# Patient Record
Sex: FEMALE | Race: ASIAN | Employment: OTHER | URBAN - METROPOLITAN AREA
[De-identification: names, ages, dates, MRNs, and addresses within clinical notes are randomized per-mention and may not be internally consistent; named-entity substitution may affect disease eponyms.]

---

## 2019-01-22 RX ORDER — SIMVASTATIN 10 MG
10 TABLET ORAL AT BEDTIME
COMMUNITY

## 2019-01-22 RX ORDER — ZOLPIDEM TARTRATE 5 MG/1
5 TABLET ORAL
COMMUNITY

## 2019-01-22 RX ORDER — MULTIPLE VITAMINS W/ MINERALS TAB 9MG-400MCG
1 TAB ORAL DAILY
COMMUNITY

## 2019-01-22 RX ORDER — OMEPRAZOLE 20 MG/1
20 TABLET, DELAYED RELEASE ORAL DAILY
COMMUNITY

## 2019-01-22 RX ORDER — DOCUSATE SODIUM 100 MG/1
100 CAPSULE, LIQUID FILLED ORAL 2 TIMES DAILY
COMMUNITY

## 2019-01-22 RX ORDER — ASPIRIN 81 MG/1
81 TABLET ORAL DAILY
COMMUNITY

## 2019-01-23 ENCOUNTER — HOSPITAL ENCOUNTER (OUTPATIENT)
Facility: CLINIC | Age: 74
Discharge: HOME | End: 2019-01-23
Attending: OPHTHALMOLOGY | Admitting: OPHTHALMOLOGY
Payer: COMMERCIAL

## 2019-01-23 ENCOUNTER — ANESTHESIA (OUTPATIENT)
Dept: SURGERY | Facility: CLINIC | Age: 74
End: 2019-01-23
Payer: COMMERCIAL

## 2019-01-23 ENCOUNTER — ANESTHESIA EVENT (OUTPATIENT)
Dept: SURGERY | Facility: CLINIC | Age: 74
End: 2019-01-23
Payer: COMMERCIAL

## 2019-01-23 VITALS
OXYGEN SATURATION: 95 % | DIASTOLIC BLOOD PRESSURE: 88 MMHG | RESPIRATION RATE: 20 BRPM | SYSTOLIC BLOOD PRESSURE: 140 MMHG | WEIGHT: 134.92 LBS | TEMPERATURE: 97.5 F | HEART RATE: 72 BPM

## 2019-01-23 PROCEDURE — 27210794 ZZH OR GENERAL SUPPLY STERILE: Performed by: OPHTHALMOLOGY

## 2019-01-23 PROCEDURE — 25000128 H RX IP 250 OP 636: Performed by: ANESTHESIOLOGY

## 2019-01-23 PROCEDURE — 25000566 ZZH SEVOFLURANE, EA 15 MIN: Performed by: OPHTHALMOLOGY

## 2019-01-23 PROCEDURE — 27210995 ZZH RX 272: Performed by: OPHTHALMOLOGY

## 2019-01-23 PROCEDURE — 37000008 ZZH ANESTHESIA TECHNICAL FEE, 1ST 30 MIN: Performed by: OPHTHALMOLOGY

## 2019-01-23 PROCEDURE — 25000128 H RX IP 250 OP 636: Performed by: NURSE ANESTHETIST, CERTIFIED REGISTERED

## 2019-01-23 PROCEDURE — 71000028 ZZH EYE RECOVERY PHASE 2 EACH 15 MINS: Performed by: OPHTHALMOLOGY

## 2019-01-23 PROCEDURE — 71000006 ZZH RECOVERY EYE PHASE 1 LEVEL 2 FIRST HR: Performed by: OPHTHALMOLOGY

## 2019-01-23 PROCEDURE — 40000170 ZZH STATISTIC PRE-PROCEDURE ASSESSMENT II: Performed by: OPHTHALMOLOGY

## 2019-01-23 PROCEDURE — 36000101 ZZH EYE SURGERY LEVEL 3 1ST 30 MIN: Performed by: OPHTHALMOLOGY

## 2019-01-23 PROCEDURE — 25000125 ZZHC RX 250: Performed by: OPHTHALMOLOGY

## 2019-01-23 PROCEDURE — 36000102 ZZH EYE SURGERY LEVEL 3 EA 15 ADDTL MIN: Performed by: OPHTHALMOLOGY

## 2019-01-23 PROCEDURE — 25000125 ZZHC RX 250: Performed by: NURSE ANESTHETIST, CERTIFIED REGISTERED

## 2019-01-23 PROCEDURE — L8610 OCULAR IMPLANT: HCPCS | Performed by: OPHTHALMOLOGY

## 2019-01-23 PROCEDURE — 37000009 ZZH ANESTHESIA TECHNICAL FEE, EACH ADDTL 15 MIN: Performed by: OPHTHALMOLOGY

## 2019-01-23 DEVICE — EYE IMP INTUBATION SET RITLENG BICANAL S1.1450: Type: IMPLANTABLE DEVICE | Site: LACRIMAL DUCT | Status: FUNCTIONAL

## 2019-01-23 RX ORDER — ERYTHROMYCIN 5 MG/G
OINTMENT OPHTHALMIC PRN
Status: DISCONTINUED | OUTPATIENT
Start: 2019-01-23 | End: 2019-01-23 | Stop reason: HOSPADM

## 2019-01-23 RX ORDER — ONDANSETRON 4 MG/1
4 TABLET, ORALLY DISINTEGRATING ORAL EVERY 30 MIN PRN
Status: DISCONTINUED | OUTPATIENT
Start: 2019-01-23 | End: 2019-01-23 | Stop reason: HOSPADM

## 2019-01-23 RX ORDER — FENTANYL CITRATE 50 UG/ML
INJECTION, SOLUTION INTRAMUSCULAR; INTRAVENOUS PRN
Status: DISCONTINUED | OUTPATIENT
Start: 2019-01-23 | End: 2019-01-23

## 2019-01-23 RX ORDER — SODIUM CHLORIDE, SODIUM LACTATE, POTASSIUM CHLORIDE, CALCIUM CHLORIDE 600; 310; 30; 20 MG/100ML; MG/100ML; MG/100ML; MG/100ML
INJECTION, SOLUTION INTRAVENOUS CONTINUOUS
Status: DISCONTINUED | OUTPATIENT
Start: 2019-01-23 | End: 2019-01-23 | Stop reason: HOSPADM

## 2019-01-23 RX ORDER — FENTANYL CITRATE 50 UG/ML
25-50 INJECTION, SOLUTION INTRAMUSCULAR; INTRAVENOUS
Status: DISCONTINUED | OUTPATIENT
Start: 2019-01-23 | End: 2019-01-23 | Stop reason: HOSPADM

## 2019-01-23 RX ORDER — LIDOCAINE HYDROCHLORIDE 20 MG/ML
INJECTION, SOLUTION INFILTRATION; PERINEURAL PRN
Status: DISCONTINUED | OUTPATIENT
Start: 2019-01-23 | End: 2019-01-23

## 2019-01-23 RX ORDER — HYDROMORPHONE HYDROCHLORIDE 1 MG/ML
.3-.5 INJECTION, SOLUTION INTRAMUSCULAR; INTRAVENOUS; SUBCUTANEOUS EVERY 10 MIN PRN
Status: DISCONTINUED | OUTPATIENT
Start: 2019-01-23 | End: 2019-01-23 | Stop reason: HOSPADM

## 2019-01-23 RX ORDER — ONDANSETRON 2 MG/ML
4 INJECTION INTRAMUSCULAR; INTRAVENOUS EVERY 30 MIN PRN
Status: DISCONTINUED | OUTPATIENT
Start: 2019-01-23 | End: 2019-01-23 | Stop reason: HOSPADM

## 2019-01-23 RX ORDER — OXYMETAZOLINE HYDROCHLORIDE 0.05 G/100ML
SPRAY NASAL PRN
Status: DISCONTINUED | OUTPATIENT
Start: 2019-01-23 | End: 2019-01-23 | Stop reason: HOSPADM

## 2019-01-23 RX ORDER — NALOXONE HYDROCHLORIDE 0.4 MG/ML
.1-.4 INJECTION, SOLUTION INTRAMUSCULAR; INTRAVENOUS; SUBCUTANEOUS
Status: DISCONTINUED | OUTPATIENT
Start: 2019-01-23 | End: 2019-01-23 | Stop reason: HOSPADM

## 2019-01-23 RX ORDER — PROPOFOL 10 MG/ML
INJECTION, EMULSION INTRAVENOUS PRN
Status: DISCONTINUED | OUTPATIENT
Start: 2019-01-23 | End: 2019-01-23

## 2019-01-23 RX ORDER — ONDANSETRON 2 MG/ML
INJECTION INTRAMUSCULAR; INTRAVENOUS PRN
Status: DISCONTINUED | OUTPATIENT
Start: 2019-01-23 | End: 2019-01-23

## 2019-01-23 RX ORDER — DEXAMETHASONE SODIUM PHOSPHATE 4 MG/ML
INJECTION, SOLUTION INTRA-ARTICULAR; INTRALESIONAL; INTRAMUSCULAR; INTRAVENOUS; SOFT TISSUE PRN
Status: DISCONTINUED | OUTPATIENT
Start: 2019-01-23 | End: 2019-01-23

## 2019-01-23 RX ORDER — MEPERIDINE HYDROCHLORIDE 25 MG/ML
12.5 INJECTION INTRAMUSCULAR; INTRAVENOUS; SUBCUTANEOUS
Status: DISCONTINUED | OUTPATIENT
Start: 2019-01-23 | End: 2019-01-23 | Stop reason: HOSPADM

## 2019-01-23 RX ADMIN — DEXAMETHASONE SODIUM PHOSPHATE 4 MG: 4 INJECTION, SOLUTION INTRA-ARTICULAR; INTRALESIONAL; INTRAMUSCULAR; INTRAVENOUS; SOFT TISSUE at 12:42

## 2019-01-23 RX ADMIN — SUCCINYLCHOLINE CHLORIDE 100 MG: 20 INJECTION, SOLUTION INTRAMUSCULAR; INTRAVENOUS; PARENTERAL at 12:27

## 2019-01-23 RX ADMIN — FENTANYL CITRATE 50 MCG: 50 INJECTION, SOLUTION INTRAMUSCULAR; INTRAVENOUS at 13:50

## 2019-01-23 RX ADMIN — SODIUM CHLORIDE, POTASSIUM CHLORIDE, SODIUM LACTATE AND CALCIUM CHLORIDE: 600; 310; 30; 20 INJECTION, SOLUTION INTRAVENOUS at 12:18

## 2019-01-23 RX ADMIN — FENTANYL CITRATE 50 MCG: 50 INJECTION, SOLUTION INTRAMUSCULAR; INTRAVENOUS at 12:27

## 2019-01-23 RX ADMIN — ONDANSETRON 4 MG: 2 INJECTION INTRAMUSCULAR; INTRAVENOUS at 12:42

## 2019-01-23 RX ADMIN — FENTANYL CITRATE 50 MCG: 50 INJECTION, SOLUTION INTRAMUSCULAR; INTRAVENOUS at 13:33

## 2019-01-23 RX ADMIN — LIDOCAINE HYDROCHLORIDE 60 MG: 20 INJECTION, SOLUTION INFILTRATION; PERINEURAL at 12:27

## 2019-01-23 RX ADMIN — PROPOFOL 160 MG: 10 INJECTION, EMULSION INTRAVENOUS at 12:27

## 2019-01-23 ASSESSMENT — COPD QUESTIONNAIRES: COPD: 0

## 2019-01-23 NOTE — OP NOTE
Pre-op Diagnosis:  1.  Right Nasolacrimal Duct Obstruction  2.  Right Epiphora  Post-operative Diagnosis:  Same    Procedure:  1.  Right Dacryocystorhinostomy 2.  Left lacrimal intubation    Surgeon:  Laz Walker MD    Anesthesia:  General, 2% Lidocaine with 1:100,000 epinephrine and sodium bicarbonate    Complications:  None    EBL:  5cc    Indications for surgery:  The patient presented to the office with a history of chronic tearing and drainage from the eye.  Lacrimal testing, including schirmers, dye dissappearance testing, and lacrimal irrigation demonstrated a nasolacrimal duct obstruction.  We discussed the risks, benefits, and alternatives of the aforementioned procedures and the patient wished to proceed.      Procedure:  The nose was sprayed with oxymetazoline spray.  The area of the medial canthus was marked using a skin marking pen.  General antesthesia was induced and the medial canthus, and infratrochlear area were infiltrated with the local anesthetic.  The nose was packed with neuro patties soaked in oxymetazoline.  The full face was prepped and draped in the usual sterile oculoplastic manner.  The upper and lower puncta were dilated with lacrimal dilators and a isiah scissor used to create a one-snip punctoplasty.  The marked area was incised with Muse scissors.  The underlying orbicularis was dissected bluntly with scissors and the periosteum incised using the monopolar cautery.  Hemostasis was achieved with the cautery.  A freer elevator was used to lift the periosteum off the lacrimal bone, and the lacrimal sac was lateralized. The medial canthal tendon was elevated, but not incised, using the elevator. The elevator was used to punch through the lacrimal bone.  Rongeurs were used to create an osteotomy through the bone.  Once an adequately sized osteotomy was created, the nasal packing was removed and a freer elevator used to tent the nasal mucosa.  A tejeda probe was inserted into the  canaliculus and an 11 blade was used to incise the medial wall of the lacrimal sac.  The isiah was used to remove a portion of the medial lacrimal sac.  The sac was inspected and found to be normal.  The nasal mucosa was incised with the blade and a superiorly based flap constructed.  The Ritleng stent was passed through both upper and lower puncta and through the nose, where it was tied.  A 5-0 vicryl suture was used to anastamose the nasal mucosal flap and the superior aspect of the lacrimal sac flap.  The orbicularis was reapproximated with a buried 5-0 vicryl stitch.  The overlying skin was closed with dermabond.  A small amount of erythromycin ointment was placed on the incision and the patient was discharge from the operating room in good condition.

## 2019-01-23 NOTE — ANESTHESIA POSTPROCEDURE EVALUATION
Patient: Amanda Dee    Procedure(s):  RIGHT DACRYOCYSTORHINOSTOMY WITH STENT PLACEMENT    Diagnosis:RIGHT DACRYOCYSTORHINOSTOMY WITH STENT PLACEMENT  Diagnosis Additional Information: No value filed.    Anesthesia Type:  General, ETT    Note:  Anesthesia Post Evaluation    Patient location during evaluation: PACU  Patient participation: Able to fully participate in evaluation  Level of consciousness: awake, awake and alert and responsive to verbal stimuli  Pain management: adequate  Airway patency: patent  Cardiovascular status: acceptable  Respiratory status: acceptable  Hydration status: acceptable  PONV: none     Anesthetic complications: None          Last vitals:  Vitals:    01/23/19 1200 01/23/19 1304 01/23/19 1315   BP: 130/84 133/82 139/73   Pulse:  70 71   Resp: 16 10 12   Temp: 36.4  C (97.6  F) 36.3  C (97.4  F) 36.3  C (97.4  F)   SpO2: 99% 97% 95%         Electronically Signed By: Vale Wilburn  January 23, 2019  1:34 PM

## 2019-01-23 NOTE — OR NURSING
discharge to home. discharge instructions to pt and pt  with .  Pt denies pain or discomfort.  Pt has no questions or concerns.  Iv discontinue.

## 2019-01-23 NOTE — ANESTHESIA PREPROCEDURE EVALUATION
Anesthesia Pre-Procedure Evaluation    Patient: Amanda Dee   MRN: [id number] : 1945          Preoperative Diagnosis: RIGHT DACRYOCYSTORHINOSTOMY WITH STENT PLACEMENT    Procedure(s):  RIGHT DACRYOCYSTORHINOSTOMY WITH STENT PLACEMENT    Past Medical History:   Diagnosis Date     Gastroesophageal reflux disease      Liver disease      No past surgical history on file.    Anesthesia Evaluation     . Pt has had prior anesthetic. Type: MAC    No history of anesthetic complications          ROS/MED HX    ENT/Pulmonary:      (-) asthma, COPD and sleep apnea   Neurologic:      (-) CVA   Cardiovascular:     (+) Dyslipidemia, ----. : . . . :. .       METS/Exercise Tolerance:     Hematologic:         Musculoskeletal:         GI/Hepatic:     (+) GERD liver disease (fatty liver),       Renal/Genitourinary:         Endo:         Psychiatric:         Infectious Disease:         Malignancy:         Other:                          Physical Exam  Normal systems: dental    Airway   Mallampati: III  TM distance: >3 FB  Neck ROM: full    Dental   Comment: Bridge, permanent denture    Cardiovascular   Rhythm and rate: regular      Pulmonary    breath sounds clear to auscultation            No results found for: WBC, HGB, HCT, PLT, CRP, SED, NA, POTASSIUM, CHLORIDE, CO2, BUN, CR, GLC, NERY, PHOS, MAG, ALBUMIN, PROTTOTAL, ALT, AST, GGT, ALKPHOS, BILITOTAL, BILIDIRECT, LIPASE, AMYLASE, MARY JANE, PTT, INR, FIBR, TSH, T4, T3, HCG, HCGS, CKTOTAL, CKMB, TROPN    Preop Vitals  BP Readings from Last 3 Encounters:   19 130/84    Pulse Readings from Last 3 Encounters:   No data found for Pulse      Resp Readings from Last 3 Encounters:   19 16    SpO2 Readings from Last 3 Encounters:   19 99%      Temp Readings from Last 1 Encounters:   19 36.4  C (97.6  F) (Temporal)    Ht Readings from Last 1 Encounters:   No data found for Ht      Wt Readings from Last 1 Encounters:   19 61.2 kg (134 lb 14.7 oz)    There is no  height or weight on file to calculate BMI.       Anesthesia Plan      History & Physical Review  History and physical reviewed and following examination; no interval change.    ASA Status:  2 .    NPO Status:  > 8 hours    Plan for General and ETT   PONV prophylaxis:  Ondansetron (or other 5HT-3)  Additional equipment: Videolaryngoscope      Postoperative Care      Consents  Anesthetic plan, risks, benefits and alternatives discussed with:  Patient..                 Vale Wilburn

## 2019-01-23 NOTE — DISCHARGE INSTRUCTIONS
Same Day Surgery Discharge Instructions for  Sedation and General Anesthesia       It's not unusual to feel dizzy, light-headed or faint for up to 24 hours after surgery or while taking pain medication.  If you have these symptoms: sit for a few minutes before standing and have someone assist you when you get up to walk or use the bathroom.      You should rest and relax for the next 24 hours. We recommend you make arrangements to have an adult stay with you for at least 24 hours after your discharge.  Avoid hazardous and strenuous activity.      DO NOT DRIVE any vehicle or operate mechanical equipment for 24 hours following the end of your surgery.  Even though you may feel normal, your reactions may be affected by the medication you have received.      Do not drink alcoholic beverages for 24 hours following surgery.       Slowly progress to your regular diet as you feel able. It's not unusual to feel nauseated and/or vomit after receiving anesthesia.  If you develop these symptoms, drink clear liquids (apple juice, ginger ale, broth, 7-up, etc. ) until you feel better.  If your nausea and vomiting persists for 24 hours, please notify your surgeon.        All narcotic pain medications, along with inactivity and anesthesia, can cause constipation. Drinking plenty of liquids and increasing fiber intake will help.      For any questions of a medical nature, call your surgeon.      Do not make important decisions for 24 hours.      If you had general anesthesia, you may have a sore throat for a couple of days related to the breathing tube used during surgery.  You may use Cepacol lozenges to help with this discomfort.  If it worsens or if you develop a fever, contact your surgeon.       If you feel your pain is not well managed with the pain medications prescribed by your surgeon, please contact your surgeon's office to let them know so they can address your concerns.       Dacryocystorhinostomy Discharge Instructions  "  POST-OPERATIVE CARE FOLLOWING OPHTHALMIC PLASTIC SURGERY AND DCR  DR. NBA WALKER        ICE PACKS:                                                                                                       Have plenty of ice on hand at home for keeping ice packs over the surgical area. You can make an ice pack by putting some ice in a zip-lock baggie, and wrapping that with a clean washcloth. Apply ice as much as you can for 48 hours after surgery taking breaks as needed. Use you ice packs while in a reclined position, with two pillows under you head. The ice packs are very important, as cold helps reduce swelling and bruising. Sleeping with two pillows under your head the night of surgery may also minimize swelling.  Start hot packs 2 days after surgery.      BANDAGES AND OINTMENTS:                                                                   Please leave your bandages in place for 24 hours unless otherwise instructed. After removing the bandages, apply the ERYTHROMYCIN OINTMENT to the surgical area (along the sutures if present) FOUR TIMES DAILY FOR 1 WEEK. If you get ointment in your eye, it will blur your vision slightly, but will not harm your eye.      ACTIVITIES:                                                                                                       Strenuous activity should be avoided for the first week after surgery. For pain or discomfort, use TYLENOL as necessary following the directions on the bottle. Please DO NOT USE ANY ASPRIN PRODUCTS. In most cases you may shower or bathe the day following surgery. Avoid getting the operative area excessively wet; gently \"dab dry\", DO NOT RUB.      COMPLICATIONS:       Excessive pain, bleeding, or loss of vision is rare. Please notify us if any of these symptoms occur, we have an on-call service after hours, just call our main office number [phone].    **If you have questions or concerns about your procedure, please contact Dr. Walker at " [phone].**

## 2019-01-23 NOTE — PROGRESS NOTES
An  of the Chinese language was present at the bedside and participated in the following events in preparation for the patient's anesthesia care: patient ID confirmed using 2 identifiers through the , written consent checked with the patient and affirmed to be correct through the , patient interviewed through the , patient's NPO status verified through the , airway assessed with directions through the , patient questions answered through the .

## 2019-01-23 NOTE — ANESTHESIA CARE TRANSFER NOTE
Patient: Amanda Dee    Procedure(s):  RIGHT DACRYOCYSTORHINOSTOMY WITH STENT PLACEMENT    Diagnosis: RIGHT DACRYOCYSTORHINOSTOMY WITH STENT PLACEMENT  Diagnosis Additional Information: No value filed.    Anesthesia Type:   General, ETT     Note:  Airway :Face Mask  Patient transferred to:PACU  Comments: Neuromuscular blockade not used after succinylcholine for intubation, spontaneous return of TOF 4/4 with sustained tetany, spontaneous respirations, adequate tidal volumes, oropharynx suctioned with soft flexible catheter, extubated atraumatically, extubated with suction, airway patent after extubation.  Oxygen via facemask at 8 liters per minute to PACU. Oxygen tubing connected to wall O2 in PACU, SpO2, NiBP, and EKG monitors and alarms on and functioning, report on patient's clinical status given to PACU RN, RN questions answered. Handoff Report: Identifed the Patient, Identified the Reponsible Provider, Reviewed the pertinent medical history, Discussed the surgical course, Reviewed Intra-OP anesthesia mangement and issues during anesthesia, Set expectations for post-procedure period and Allowed opportunity for questions and acknowledgement of understanding      Vitals: (Last set prior to Anesthesia Care Transfer)    CRNA VITALS  1/23/2019 1232 - 1/23/2019 1307      1/23/2019             Ht Rate:  60    Resp Rate (observed):  2  (Abnormal)     Resp Rate (set):  10                Electronically Signed By: ZACH Meyers CRNA  January 23, 2019  1:07 PM

## 2019-01-23 NOTE — BRIEF OP NOTE
[facility] Brief Operative Note    Pre-operative diagnosis: RIGHT DACRYOCYSTORHINOSTOMY WITH STENT PLACEMENT   Post-operative diagnosis * No post-op diagnosis entered *  SameSee dictation   Procedure: Procedure(s):  RIGHT DACRYOCYSTORHINOSTOMY WITH STENT PLACEMENT   Surgeon(s): Surgeon(s) and Role:     * Laz Walker MD - Primary   Estimated blood loss: * No values recorded between 1/23/2019 12:36 PM and 1/23/2019 12:55 PM *    Specimens: * No specimens in log *   Findings: See dictation

## (undated) DEVICE — GLOVE PROTEXIS W/NEU-THERA 7.5  2D73TE75

## (undated) DEVICE — SPONGE COTTONOID 1/2X3" 80-1407

## (undated) DEVICE — SUCTION CANISTER MEDIVAC LINER 1500ML W/LID 65651-515

## (undated) DEVICE — BLADE KNIFE SURG 11 371111

## (undated) DEVICE — ESU PENCIL W/HOLSTER E2350H

## (undated) DEVICE — LINEN TOWEL PACK X5 5464

## (undated) DEVICE — SOL WATER IRRIG 1000ML BOTTLE 2F7114

## (undated) DEVICE — PACK OCULOPLATIC SEN15OCFSD

## (undated) DEVICE — EYE PREP BETADINE 5% SOLUTION 30ML 0065-0411-30

## (undated) RX ORDER — ERYTHROMYCIN 5 MG/G
OINTMENT OPHTHALMIC
Status: DISPENSED
Start: 2019-01-23

## (undated) RX ORDER — LIDOCAINE HYDROCHLORIDE AND EPINEPHRINE BITARTRATE 20; .01 MG/ML; MG/ML
INJECTION, SOLUTION SUBCUTANEOUS
Status: DISPENSED
Start: 2019-01-23

## (undated) RX ORDER — FENTANYL CITRATE 50 UG/ML
INJECTION, SOLUTION INTRAMUSCULAR; INTRAVENOUS
Status: DISPENSED
Start: 2019-01-23

## (undated) RX ORDER — OXYMETAZOLINE HYDROCHLORIDE 0.05 G/100ML
SPRAY NASAL
Status: DISPENSED
Start: 2019-01-23